# Patient Record
Sex: MALE | Race: WHITE | Employment: UNEMPLOYED | URBAN - METROPOLITAN AREA
[De-identification: names, ages, dates, MRNs, and addresses within clinical notes are randomized per-mention and may not be internally consistent; named-entity substitution may affect disease eponyms.]

---
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
## 2025-01-22 ENCOUNTER — TELEPHONE (OUTPATIENT)
Dept: GASTROENTEROLOGY | Age: 20
End: 2025-01-22

## 2025-01-22 NOTE — TELEPHONE ENCOUNTER
NP/Peptic ulcer disease/Epigastric pain  1st attempt- Writer called pt to verify his ins. [place] is inactive. Pt states he has a new ins card, and will call the office when he gets home with the ins information.

## 2025-01-26 PROBLEM — K27.9 PEPTIC ULCER DISEASE: Status: ACTIVE | Noted: 2025-01-26
